# Patient Record
Sex: FEMALE | Race: WHITE | URBAN - METROPOLITAN AREA
[De-identification: names, ages, dates, MRNs, and addresses within clinical notes are randomized per-mention and may not be internally consistent; named-entity substitution may affect disease eponyms.]

---

## 2017-03-22 ENCOUNTER — OFFICE VISIT (OUTPATIENT)
Dept: INTERNAL MEDICINE CLINIC | Age: 42
End: 2017-03-22

## 2017-03-22 VITALS
RESPIRATION RATE: 16 BRPM | DIASTOLIC BLOOD PRESSURE: 73 MMHG | OXYGEN SATURATION: 98 % | HEART RATE: 60 BPM | SYSTOLIC BLOOD PRESSURE: 110 MMHG | BODY MASS INDEX: 20.61 KG/M2 | TEMPERATURE: 97.8 F | WEIGHT: 112 LBS | HEIGHT: 62 IN

## 2017-03-22 DIAGNOSIS — J30.0 VASOMOTOR RHINITIS: ICD-10-CM

## 2017-03-22 DIAGNOSIS — Z00.00 GENERAL MEDICAL EXAM: Primary | ICD-10-CM

## 2017-03-22 DIAGNOSIS — Z78.9 VEGETARIAN: ICD-10-CM

## 2017-03-22 NOTE — PROGRESS NOTES
Chief Complaint   Patient presents with   [facility].     Catherine Nieto is a 46 y.o. female with the following history as recorded in [facility]:  Patient Active Problem List    Diagnosis Date Noted    Vasomotor rhinitis 03/22/2017    Vegetarian 03/22/2017       Allergies: Ciprofloxacin  Past Medical History:   Diagnosis Date    Vegetarian 3/22/2017     No past surgical history on file. Family History   Problem Relation Age of Onset    Cancer Mother     Cancer Father     Hypertension Father      Social History   Substance Use Topics    Smoking status: Never Smoker    Smokeless tobacco: Not on file    Alcohol use 1.8 oz/week     3 Glasses of wine per week     Subjective: This is a new patient, 46year old white female in good general health, vegetarian for eight years,  is a vegetarian, nonsmoker. Really no big complaints. She likes to have her labs done yearly because of her history of being a vegetarian and worry about iron deficiencies, vitamin deficiencies. She specifically takes a multivitamin with good B12 supplement and good vitamin D supplement and iron supplement. She is a vegetarian, does not eat any meat or poultry, no eggs. Uses soy and soy products a lot for calcium sources. She feels well. She runs three days a week, has run a few half marathons. No hopes of doing a full marathon she does not think. She stays in pretty good health. She says she has a history of some rhinitis or vasomotor rhinitis with drainage and dripping and mostly postnasal irritation, usually triggered by certain chemical smells, certainly triggered by smoke. Her neighborhood she lives in in the winter time a lot of people burn fireplaces and wood stoves and she notices that bothers her. They moved here from [place] two years ago and the smoke was not an issue in [place]. Cold weather does not bother her, pollen does not bother her, mold does not bother her.   She says she was allergy tested many years ago and nothing specific was found. She has no asthma, no shortness of breath or wheezing. She works part of the time as a consultant. No children. Regular GYN. Has not had a few GYN exam in a years and needs to find a gynecologist.    Physical Examination:  VITALS:  Her blood pressure is normal.  HEENT:  Normal.  CHEST:  Lungs clear. CV:  S1, S2 regular. ABDOMEN:  Soft. NEURO:  Normal.    Recommendations:  1. I would recommend routine screening lab work. 2. Refer her to OB/GYN for continued care, Pap smears. Presently not using any birth control, has never been able to become pregnant according to her. We did not get into more details about it. 3. I suggested trial of Flonase for the rhinitis symptoms or antihistamine such as Allegra. Flonase may give her better relief. Told her to try at least a two week course, then use it if it continues to be helpful. 4. Continue healthy lifestyle, continue exercise, continue low on alcohol, continue vegetarian diet. Vitals:    03/22/17 1055 03/22/17 1120   BP: 130/74 110/73   Pulse: 60    Resp: 16    Temp: 97.8 °F (36.6 °C)    TempSrc: Oral    SpO2: 98%    Weight: 112 lb (50.8 kg)    Height: 5' 2\" (1.575 m)      Torres Proeille was seen today for establish care.     Diagnoses and all orders for this visit:    Vegetarian  -     METABOLIC PANEL, COMPREHENSIVE  -     VITAMIN D, 25 HYDROXY    Vasomotor rhinitis    General medical exam  -     CBC WITH AUTOMATED DIFF  -     METABOLIC PANEL, COMPREHENSIVE  -     LIPID PANEL  -     VITAMIN D, 25 HYDROXY      Nasal drasinage and allergies I advise using Flonase OTC as a symptomatic reliever

## 2017-03-22 NOTE — MR AVS SNAPSHOT
Visit Information Date & Time Provider Department Dept. Phone Encounter #  
 3/22/2017 10:30 AM Dot Jerez MD [facility] [phone] [id number] Allergies as of 3/22/2017  Review Complete On: 3/22/2017 By: Oniel Amato LPN Severity Noted Reaction Type Reactions Ciprofloxacin  03/22/2017    Unknown (comments) Joint pain, flu like symptoms Current Immunizations  Reviewed on 3/22/2017 Name Date Td 11/4/2010 Reviewed by Dot Jerez MD on 3/22/2017 at 11:25 AM  
You Were Diagnosed With   
  
 Codes Comments Vegetarian    -  Primary ICD-10-CM: Z78.9 ICD-9-CM: V49.89 Vasomotor rhinitis     ICD-10-CM: J30.0 ICD-9-CM: 477.9 General medical exam     ICD-10-CM: Z00.00 ICD-9-CM: V70.9 Vitals BP Pulse Temp Resp Height(growth percentile) Weight(growth percentile) 110/73 60 97.8 °F (36.6 °C) (Oral) 16 5' 2\" (1.575 m) 112 lb (50.8 kg) LMP SpO2 BMI OB Status Smoking Status 03/01/2017 98% 20.49 kg/m2 Having regular periods Never Smoker Vitals History BMI and BSA Data Body Mass Index Body Surface Area  
 20.49 kg/m 2 1.49 m 2 Preferred Pharmacy Pharmacy Name Phone [id number], [address] [phone] Your Updated Medication List  
  
Notice  As of 3/22/2017 11:31 AM  
 You have not been prescribed any medications. We Performed the Following CBC WITH AUTOMATED DIFF [88983 CPT(R)] LIPID PANEL [93900 CPT(R)] METABOLIC PANEL, COMPREHENSIVE [80050 CPT(R)] VITAMIN D, 25 HYDROXY [id number] CPT(R)] Introducing [facility] [place] [facility]! Keara Yung introduces Doctor [employer] patient portal. Now you can access parts of your medical record, email your doctor's office, and request medication refills online. 1. In your internet browser, go to [email or web address] 2. Click on the First Time User? Click Here link in the Sign In box. You will see the New Member Sign Up page. 3. Enter your [employer] Access Code exactly as it appears below. You will not need to use this code after youve completed the sign-up process. If you do not sign up before the expiration date, you must request a new code. · [employer] Access Code: [email or web address] Expires: 6/20/2017 11:23 AM 
 
4. Enter the last four digits of your Social Security Number (xxxx) and Date of Birth (mm/dd/yyyy) as indicated and click Submit. You will be taken to the next sign-up page. 5. Create a [employer] ID. This will be your [employer] login ID and cannot be changed, so think of one that is secure and easy to remember. 6. Create a [employer] password. You can change your password at any time. 7. Enter your Password Reset Question and Answer. This can be used at a later time if you forget your password. 8. Enter your e-mail address. You will receive e-mail notification when new information is available in [address] 9. Click Sign Up. You can now view and download portions of your medical record. 10. Click the Download Summary menu link to download a portable copy of your medical information. If you have questions, please visit the Frequently Asked Questions section of the [employer] website. Remember, [employer] is NOT to be used for urgent needs. For medical emergencies, dial 911. Now available from your iPhone and Android! Please provide this summary of care documentation to your next provider. If you have any questions after today's visit, please call [phone].

## 2017-03-23 LAB
25(OH)D3+25(OH)D2 SERPL-MCNC: 13.4 NG/ML (ref 30–100)
ALBUMIN SERPL-MCNC: 4.4 G/DL (ref 3.5–5.5)
ALBUMIN/GLOB SERPL: 1.6 {RATIO} (ref 1.2–2.2)
ALP SERPL-CCNC: 44 IU/L (ref 39–117)
ALT SERPL-CCNC: 9 IU/L (ref 0–32)
AST SERPL-CCNC: 17 IU/L (ref 0–40)
BASOPHILS # BLD AUTO: 0 X10E3/UL (ref 0–0.2)
BASOPHILS NFR BLD AUTO: 0 %
BILIRUB SERPL-MCNC: 0.9 MG/DL (ref 0–1.2)
BUN SERPL-MCNC: 10 MG/DL (ref 6–20)
BUN/CREAT SERPL: 17 (ref 8–20)
CALCIUM SERPL-MCNC: 9.4 MG/DL (ref 8.7–10.2)
CHLORIDE SERPL-SCNC: 101 MMOL/L (ref 96–106)
CHOLEST SERPL-MCNC: 153 MG/DL (ref 100–199)
CO2 SERPL-SCNC: 22 MMOL/L (ref 18–29)
CREAT SERPL-MCNC: 0.59 MG/DL (ref 0.57–1)
EOSINOPHIL # BLD AUTO: 0 X10E3/UL (ref 0–0.4)
EOSINOPHIL NFR BLD AUTO: 0 %
ERYTHROCYTE [DISTWIDTH] IN BLOOD BY AUTOMATED COUNT: 14.3 % (ref 12.3–15.4)
GLOBULIN SER CALC-MCNC: 2.8 G/DL (ref 1.5–4.5)
GLUCOSE SERPL-MCNC: 87 MG/DL (ref 65–99)
HCT VFR BLD AUTO: 38.6 % (ref 34–46.6)
HDLC SERPL-MCNC: 79 MG/DL
HGB BLD-MCNC: 12.8 G/DL (ref 11.1–15.9)
IMM GRANULOCYTES # BLD: 0 X10E3/UL (ref 0–0.1)
IMM GRANULOCYTES NFR BLD: 0 %
INTERPRETATION, 910389: NORMAL
LDLC SERPL CALC-MCNC: 59 MG/DL (ref 0–99)
LYMPHOCYTES # BLD AUTO: 1.1 X10E3/UL (ref 0.7–3.1)
LYMPHOCYTES NFR BLD AUTO: 22 %
MCH RBC QN AUTO: 30 PG (ref 26.6–33)
MCHC RBC AUTO-ENTMCNC: 33.2 G/DL (ref 31.5–35.7)
MCV RBC AUTO: 90 FL (ref 79–97)
MONOCYTES # BLD AUTO: 0.4 X10E3/UL (ref 0.1–0.9)
MONOCYTES NFR BLD AUTO: 9 %
NEUTROPHILS # BLD AUTO: 3.3 X10E3/UL (ref 1.4–7)
NEUTROPHILS NFR BLD AUTO: 69 %
PLATELET # BLD AUTO: 180 X10E3/UL (ref 150–379)
POTASSIUM SERPL-SCNC: 3.9 MMOL/L (ref 3.5–5.2)
PROT SERPL-MCNC: 7.2 G/DL (ref 6–8.5)
RBC # BLD AUTO: 4.27 X10E6/UL (ref 3.77–5.28)
SODIUM SERPL-SCNC: 140 MMOL/L (ref 134–144)
TRIGL SERPL-MCNC: 76 MG/DL (ref 0–149)
VLDLC SERPL CALC-MCNC: 15 MG/DL (ref 5–40)
WBC # BLD AUTO: 4.9 X10E3/UL (ref 3.4–10.8)